# Patient Record
Sex: MALE | Race: WHITE
[De-identification: names, ages, dates, MRNs, and addresses within clinical notes are randomized per-mention and may not be internally consistent; named-entity substitution may affect disease eponyms.]

---

## 2022-12-14 ENCOUNTER — HOSPITAL ENCOUNTER (EMERGENCY)
Dept: HOSPITAL 47 - EC | Age: 37
Discharge: HOME | End: 2022-12-14
Payer: COMMERCIAL

## 2022-12-14 VITALS — TEMPERATURE: 98.5 F

## 2022-12-14 VITALS — HEART RATE: 74 BPM | DIASTOLIC BLOOD PRESSURE: 74 MMHG | RESPIRATION RATE: 16 BRPM | SYSTOLIC BLOOD PRESSURE: 122 MMHG

## 2022-12-14 DIAGNOSIS — S09.90XA: ICD-10-CM

## 2022-12-14 DIAGNOSIS — F17.200: ICD-10-CM

## 2022-12-14 DIAGNOSIS — S83.92XA: Primary | ICD-10-CM
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
## 2022-12-14 DIAGNOSIS — J45.909: ICD-10-CM
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
## 2022-12-14 PROCEDURE — 99283 EMERGENCY DEPT VISIT LOW MDM: CPT

## 2022-12-14 NOTE — XR
EXAMINATION TYPE: XR knee complete LT

 

DATE OF EXAM: 12/14/2022 4:46 PM

 

INDICATION: 

Patient age:Male;  37 years old; 

Reason for study: knee pain post fall; 

 

COMPARISON: None.

 

TECHNIQUE: The Left knee(s) was examined in Frontal, lateral and oblique projections.

 

FINDINGS:   No evidence of any acute osseous pathology, joint space narrowing, soft tissue swelling, 
or joint effusion is noted.

 

 

IMPRESSION: 

1. No acute osseous pathology.

.
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
